# Patient Record
Sex: MALE | URBAN - METROPOLITAN AREA
[De-identification: names, ages, dates, MRNs, and addresses within clinical notes are randomized per-mention and may not be internally consistent; named-entity substitution may affect disease eponyms.]

---

## 2024-08-09 ENCOUNTER — TELEPHONE (OUTPATIENT)
Dept: OTOLARYNGOLOGY | Facility: CLINIC | Age: 69
End: 2024-08-09

## 2024-08-09 NOTE — TELEPHONE ENCOUNTER
Received a Faxed paper referral to contact this patient for appt for persistent cough. PCP is outside [facility].  Chart is very empty and patient did not answer the phone.  If he calls back to schedule the appt, we will need all of his information and the referral was for either Dr. Llanos or Dr. Sorto.   Thank you!

## 2024-10-23 ENCOUNTER — TELEPHONE (OUTPATIENT)
Age: 69
End: 2024-10-23

## 2024-10-23 NOTE — TELEPHONE ENCOUNTER
New Patient    What is the reason for the patient’s appointment?: Patient wanting to schedule an appt due to his PSA coming back elevated.    What office location does the patient prefer?: [place]    Does patient have Imaging/Lab Results: PSA done last month resulted at 8.7    Have patient records been requested?:  If No, are the records showing in Epic:       INSURANCE:   Do we accept the patient's insurance or is the patient Self-Pay?: Yes    Insurance Provider: [facility]   Plan Type/Number:   Member ID#:       HISTORY:   Has the patient had any previous Urologist(s)?: N/a    Was the patient seen in the ED?: No    Has the patient had any outside testing done?: Just PSA    Does the patient have a personal history of cancer?: No    Patient scheduled on 11/12 at 3 pm with Loco Hernandez in [place]. Patient will bring his PSA results with him for the appt.

## 2024-11-06 NOTE — PROGRESS NOTES
"Kee Mcneil is a(n) 69 y.o. male. , :  1955    Subjective     Assessment:  The encounter diagnosis was Elevated PSA.  Elevated PSA   Claustrophobia and unable to get MRI   Bottom 1/2 prostate palp.  No nodules   Desire for repeat labs prior to prostate biopsy       Plan:  F/u 4 months with 4k score prior.  If unable to get 4k score get total and free PSA  Will discuss prostate biopsy at f/u.       Radiology       History  Elevated PSA     Labs   24 PSA 8.07       2024 OV Loco Mary  68 y/o male presents for PSA elevation.  Pt notes approx 1 1/2 years ago had a PSA around 5 done by his PCP.  Just had PSA repeated 24 and was 8.07.   No hx of prostate issues, no FH of prostate cancer, notes a decent urinary stream, no dysuria, no hematuria, no unexpected weight loss, no nocturnal bone pain.     Review of Systems        Objective     BP (!) 178/92 (BP Location: Left arm, Patient Position: Sitting, Cuff Size: Large)   Pulse 71   Ht 5' 10\" (1.778 m)   Wt 115 kg (253 lb 9.6 oz)   SpO2 95%   BMI 36.39 kg/m²     Physical Exam  HENT:      Head: Normocephalic.   Pulmonary:      Effort: Pulmonary effort is normal.   Genitourinary:     Comments: Prostate  Bottom 1/2 palpated  Soft, NT  No nodules   Skin:     General: Skin is warm.   Psychiatric:         Mood and Affect: Mood normal.           Loco Hernandez [facility]  "

## 2024-11-12 ENCOUNTER — OFFICE VISIT (OUTPATIENT)
Dept: UROLOGY | Facility: CLINIC | Age: 69
End: 2024-11-12
Payer: COMMERCIAL

## 2024-11-12 VITALS
BODY MASS INDEX: 36.31 KG/M2 | DIASTOLIC BLOOD PRESSURE: 92 MMHG | HEIGHT: 70 IN | SYSTOLIC BLOOD PRESSURE: 178 MMHG | HEART RATE: 71 BPM | OXYGEN SATURATION: 95 % | WEIGHT: 253.6 LBS

## 2024-11-12 DIAGNOSIS — R97.20 ELEVATED PSA: Primary | ICD-10-CM

## 2024-11-12 LAB
POST-VOID RESIDUAL VOLUME, ML POC: 25 ML
SL AMB  POCT GLUCOSE, UA: NORMAL
SL AMB LEUKOCYTE ESTERASE,UA: NORMAL
SL AMB POCT BILIRUBIN,UA: NORMAL
SL AMB POCT BLOOD,UA: NORMAL
SL AMB POCT CLARITY,UA: CLEAR
SL AMB POCT COLOR,UA: YELLOW
SL AMB POCT KETONES,UA: NORMAL
SL AMB POCT NITRITE,UA: NORMAL
SL AMB POCT PH,UA: 5.5
SL AMB POCT SPECIFIC GRAVITY,UA: 1.02
SL AMB POCT URINE PROTEIN: NORMAL
SL AMB POCT UROBILINOGEN: 0.2

## 2024-11-12 PROCEDURE — 99203 OFFICE O/P NEW LOW 30 MIN: CPT | Performed by: PHYSICIAN ASSISTANT

## 2024-11-12 PROCEDURE — 51798 US URINE CAPACITY MEASURE: CPT | Performed by: PHYSICIAN ASSISTANT

## 2024-11-12 PROCEDURE — 81003 URINALYSIS AUTO W/O SCOPE: CPT | Performed by: PHYSICIAN ASSISTANT

## 2024-11-12 RX ORDER — ALBUTEROL SULFATE 90 UG/1
1 INHALANT RESPIRATORY (INHALATION) EVERY 4 HOURS PRN
COMMUNITY
Start: 2024-09-03

## 2024-11-12 RX ORDER — ASPIRIN 81 MG/1
81 TABLET, CHEWABLE ORAL DAILY
COMMUNITY

## 2024-11-12 RX ORDER — LISINOPRIL AND HYDROCHLOROTHIAZIDE 20; 25 MG/1; MG/1
1 TABLET ORAL DAILY
COMMUNITY
Start: 2024-10-25

## 2024-11-12 RX ORDER — ROPINIROLE 0.25 MG/1
0.5 TABLET, FILM COATED ORAL 2 TIMES DAILY PRN
COMMUNITY
Start: 2024-10-25

## 2024-11-12 RX ORDER — BISOPROLOL FUMARATE 10 MG/1
1 TABLET, FILM COATED ORAL 2 TIMES DAILY
COMMUNITY
Start: 2024-10-25

## 2024-11-12 RX ORDER — TRAMADOL HYDROCHLORIDE 50 MG/1
150 TABLET ORAL 3 TIMES DAILY
COMMUNITY
Start: 2024-11-04

## 2024-11-13 ENCOUNTER — TELEPHONE (OUTPATIENT)
Dept: UROLOGY | Facility: CLINIC | Age: 69
End: 2024-11-13

## 2024-11-13 ENCOUNTER — TELEPHONE (OUTPATIENT)
Dept: UROLOGY | Facility: HOSPITAL | Age: 69
End: 2024-11-13

## 2024-11-15 DIAGNOSIS — R97.20 ELEVATED PSA: Primary | ICD-10-CM

## 2025-03-12 ENCOUNTER — TELEPHONE (OUTPATIENT)
Dept: UROLOGY | Facility: CLINIC | Age: 70
End: 2025-03-12